# Patient Record
(demographics unavailable — no encounter records)

---

## 2025-05-08 NOTE — HISTORY OF PRESENT ILLNESS
[de-identified] : S/P  Open reduction and internal fixation, right patella. 2/7/25 [de-identified] : 63 yo F presents today for routine post op followup S/P Open reduction and internal fixation, right comminuted patella fx. her knee brace is discontinued.  She states that she is participating in therapy. [de-identified] : Physical exam R knee  - Surgical incision is fully healed there is no erythema drainage or induration.  Sensation intact light touch throughout the right lower extremity TN SPN DPN SN distributions.  She can actively straight leg raise against gravity and mild resistance.  Active knee range of motion is 0 to 40 degrees of flexion.  She has full range of motion of her ankle foot and toes normal neurovascular exam [de-identified] : 3 views of the right knee taken today and reviewed by me show healed right patella fracture with all hardware in good position no signs mechanical failure. [de-identified] : S/P  Open reduction and internal fixation, right patella.  -Extension contracture of the knee [de-identified] : Long discussion was had with the patient.  Need for aggressive passive stretching of the knee was discussed in detail.  It was discussed with the patient that this will be an uncomfortable and most likely painful experience with physical therapy but necessary to regain proper flexion of the knee.  continue weightbearing and range of motion as tolerated.  She may wean from all assistive devices as tolerated.  She was provided with a new physical therapy prescription and the physical therapist was notified of the findings.  We will see her back in 4 weeks for routine follow-up all questions were answered.  The possible need for knee manipulation if motion does not improve with therapy as mentioned.  Also a dynamic brace is mentioned.  We will see her back in 4 weeks